# Patient Record
Sex: FEMALE | Race: BLACK OR AFRICAN AMERICAN | NOT HISPANIC OR LATINO | Employment: UNEMPLOYED | URBAN - METROPOLITAN AREA
[De-identification: names, ages, dates, MRNs, and addresses within clinical notes are randomized per-mention and may not be internally consistent; named-entity substitution may affect disease eponyms.]

---

## 2019-03-19 ENCOUNTER — OFFICE VISIT (OUTPATIENT)
Dept: PODIATRY | Facility: CLINIC | Age: 84
End: 2019-03-19
Payer: MEDICARE

## 2019-03-19 VITALS
WEIGHT: 166 LBS | BODY MASS INDEX: 25.16 KG/M2 | HEIGHT: 68 IN | DIASTOLIC BLOOD PRESSURE: 76 MMHG | SYSTOLIC BLOOD PRESSURE: 136 MMHG | HEART RATE: 82 BPM

## 2019-03-19 DIAGNOSIS — B35.1 ONYCHOMYCOSIS DUE TO DERMATOPHYTE: Primary | ICD-10-CM

## 2019-03-19 PROCEDURE — 99999 PR PBB SHADOW E&M-NEW PATIENT-LVL III: ICD-10-PCS | Mod: PBBFAC,,, | Performed by: PODIATRIST

## 2019-03-19 PROCEDURE — 99203 PR OFFICE/OUTPT VISIT, NEW, LEVL III, 30-44 MIN: ICD-10-PCS | Mod: S$GLB,,, | Performed by: PODIATRIST

## 2019-03-19 PROCEDURE — 99203 OFFICE O/P NEW LOW 30 MIN: CPT | Mod: S$GLB,,, | Performed by: PODIATRIST

## 2019-03-19 PROCEDURE — 99999 PR PBB SHADOW E&M-NEW PATIENT-LVL III: CPT | Mod: PBBFAC,,, | Performed by: PODIATRIST

## 2019-03-19 PROCEDURE — 1101F PT FALLS ASSESS-DOCD LE1/YR: CPT | Mod: CPTII,S$GLB,, | Performed by: PODIATRIST

## 2019-03-19 PROCEDURE — 1101F PR PT FALLS ASSESS DOC 0-1 FALLS W/OUT INJ PAST YR: ICD-10-PCS | Mod: CPTII,S$GLB,, | Performed by: PODIATRIST

## 2019-03-19 NOTE — PATIENT INSTRUCTIONS
Recommend lotions: eucerin, eucerin for diabetics, aquaphor, A&D ointment, gold bond for diabetics, sween, Martin's Bees all purpose baby ointment,  urea 40 with aloe (found on amazon.com)    Shoe recommendations: (try 6pm.com, zappos.Blaze health , nordstromrack.Blaze health, or shoes.Blaze health for discounted prices) you can visit [employer] shoes in [place]  or [facility] in the [facility] (there are also several shoe brand outlets in the [facility])    Asics (GT 2000 or gel foundations), new balance stability type shoes (such as the 940 series), saucony (stabil c3),  Noble (GTS or Beast or transcend), propet (tennis shoe)    Sofft Brand (women) Vijaya&Loki (men), clarks, crocs, aerosoles, naturalizers, SAS, ecco, born, lorene joyner, rockports (dress shoes)    Vionic, burkenstocks, fitflops, propet (sandals)  Nike comfort thong sandals, crocs, propet (house shoes)    Nail Home remedy:  Vicks Vapor rub to nails for easier manageability    Athletes Foot     Athletes Foot is caused by a fungal infection in the skin. It affects the skin between the toes where it causes fissures (cracks in the skin). It can also affect the bottom of the foot where it causes dry white scales and peeling of the skin. This infection is more likely to occur when the foot is in hot, sweaty socks and shoes for long periods of time.   This infection is treated with skin creams or oral medicine.     Home Care:   It is important to keep the feet dry. Use absorbent cotton socks and change them if they become sweaty; or wear an open-toe shoe or sandal. Wash the feet at least once a day with soap and water.   Rotate your shoes. If you must wear the same shoes everyday then spray the shoes with lysol or antifungal spray and allow that to dry overnight before wearing the shoes again  Apply the antifungal cream as prescribed. Some antifungal creams are available without a prescription (Lotrimin, Tinactin).   It may take a week before the rash starts to improve and it can  take about three to four weeks to completely clear. Continue the medicine until the rash is all gone.   Use over-the-counter antifungal powders or sprays on your feet after exposure to high-risk environments (public showers, gyms and locker rooms) may prevent future infections. You may wish to use appropriate footwear to reduce exposure.  Clean tubs and bathroom floor with bleach  Clean feet with Nizoral shampoo or dial antibacterial soap and then dry completely.    Follow Up   with your doctor as recommended by our staff if the rash is not starting to improve after TEN days of treatment, or if the rash continues to spread.     Get Prompt Medical Attention   if any of the following occur:   Increasing redness or swelling of the foot   Pus draining from cracks in the skin   Fever of 100.4ºF (38ºC) or higher, or as directed by your healthcare provider    © 2220-0692 Keysha Abrams, [address] [place] [address]. All rights reserved. This information is not intended as a substitute for professional medical care. Always follow your healthcare professional's instructions.

## 2019-03-19 NOTE — LETTER
March 19, 2019      Tommy Westfall MD  [address] [facility] [address]  [address]  [place] [address]           [facility]  [address] [facility]  [place] [address]  Phone: [phone]          Patient: Hedy Ryan   MR Number: [id number]   YOB: 1935   Date of Visit: 3/19/2019       Dear Dr. Tommy Westfall:    Thank you for referring Hedy Ryan to me for evaluation. Attached you will find relevant portions of my assessment and plan of care.    If you have questions, please do not hesitate to call me. I look forward to following Hedy Ryan along with you.    Sincerely,    Samanta Iglesias DPM    Enclosure  CC:  No Recipients    If you would like to receive this communication electronically, please contact [email or web address] or [phone] to request more information on [facility] access.    For providers and/or their staff who would like to refer a patient to [facility], please contact us through our one-stop-shop provider referral line, [facility], at [phone].    If you feel you have received this communication in error or would no longer like to receive these types of communications, please e-mail [email or web address]

## 2019-03-19 NOTE — PROGRESS NOTES
"Subjective:      Patient ID: Hedy Ryan is a 83 y.o. female.    Chief Complaint: Nail Problem (fungus Pcp Dr. Westfall 10/11/18) and Nail Care    Hedy Ryan is a 83 y.o. female who presents to the clinic complaining of "ugly white toenails." Patient has not attempted self treatment.  This is a persistent problem. Patient denies history of trauma. Patient  denies purulent drainage.    Current shoe gear:  Casual shoes    Patient Active Problem List   Diagnosis    Primary localized osteoarthrosis, lower leg    Subdural hemorrhage    SDH (subdural hematoma)    Primary localized osteoarthrosis, pelvic region and thigh    Subdural hematoma    Subdural hematoma    Cerebral edema    Aphasia    Encephalopathy       Current Outpatient Medications on File Prior to Visit   Medication Sig Dispense Refill    hydrocodone-acetaminophen 5-325mg (NORCO) 5-325 mg per tablet   0     No current facility-administered medications on file prior to visit.        Review of patient's allergies indicates:   Allergen Reactions    Demerol [meperidine] Other (See Comments)     Hallucinations       Past Surgical History:   Procedure Laterality Date    BRAIN SURGERY  3/11/14    craniotomy for SDH    BRAIN SURGERY  6/16/14    craniotomy for SDH    BRAIN SURGERY  6/24/14    craniotomy for SDH    CHOLECYSTECTOMY  1982    Craniotomy  3/2014    after a fall    CRANIOTOMY OPEN FOR SUB-DURAL HEMATOMA Left 6/24/2014    [facility]ND [facility]    CRANIOTOMY OPEN FOR SUB-DURAL HEMATOMA Left 6/16/2014    [facility]ND [facility]    CRANIOTOMY, FOR SUBDURAL HEMATOMA EVACUATION Right 3/11/2014    [facility]ND [facility]    HIP ARTHROPLASTY  4/29/14    right    HYSTERECTOMY  1982    JOINT REPLACEMENT  1993, July    Rt hip relacement    REVISION TOTAL HIP ARTHROPLASTY  2008    cup revision on hip    REVISION, TOTAL ARTHROPLASTY, HIP Right " "2014    [facility]    REVISION, TOTAL ARTHROPLASTY, HIP Right 2014    [facility] at [facility]       History reviewed. No pertinent family history.    Social History     Socioeconomic History    Marital status:      Spouse name: Not on file    Number of children: Not on file    Years of education: Not on file    Highest education level: Not on file   Social Needs    Financial resource strain: Not on file    Food insecurity - worry: Not on file    Food insecurity - inability: Not on file    Transportation needs - medical: Not on file    Transportation needs - non-medical: Not on file   Occupational History    Not on file   Tobacco Use    Smoking status: Former Smoker     Last attempt to quit: 1972     Years since quittin.2    Smokeless tobacco: Never Used   Substance and Sexual Activity    Alcohol use: No    Drug use: No    Sexual activity: No   Other Topics Concern    Not on file   Social History Narrative    Not on file       Review of Systems   Constitution: Negative for chills, fever and weakness.   Cardiovascular: Negative for claudication and leg swelling.   Respiratory: Negative for cough and shortness of breath.    Skin: Positive for dry skin and nail changes. Negative for itching and rash.   Musculoskeletal: Positive for joint pain and myalgias. Negative for falls, joint swelling and muscle weakness.   Gastrointestinal: Negative for diarrhea, nausea and vomiting.   Neurological: Positive for paresthesias. Negative for numbness and tremors.   Psychiatric/Behavioral: Negative for altered mental status and hallucinations.           Objective:      Vitals:    19 1519   BP: 136/76   Pulse: 82   Weight: 75.3 kg (166 lb)   Height: 5' 8" (1.727 m)   PainSc: 0-No pain       Physical Exam   Constitutional:  Non-toxic appearance. She does not have a sickly appearance. No distress.   Cardiovascular:   Pulses:   "     Dorsalis pedis pulses are 2+ on the right side, and 2+ on the left side.        Posterior tibial pulses are 2+ on the right side, and 2+ on the left side.   dorsalis pedis and posterior tibial pulses are palpable bilaterally. Capillary refill time is within normal limits.   Pulmonary/Chest: No respiratory distress.   Musculoskeletal:        Right ankle: She exhibits normal range of motion and no swelling. No tenderness. No lateral malleolus, no medial malleolus, no AITFL, no CF ligament and no posterior TFL tenderness found. Achilles tendon exhibits no pain, no defect and normal Cota's test results.        Left ankle: She exhibits normal range of motion and no swelling. No tenderness. No lateral malleolus, no medial malleolus, no AITFL, no CF ligament and no posterior TFL tenderness found. Achilles tendon exhibits no pain, no defect and normal Cota's test results.        Right foot: There is no tenderness and no bony tenderness.        Left foot: There is no tenderness and no bony tenderness.   Neurological: She has normal reflexes. She displays no atrophy and no tremor. No sensory deficit. She exhibits normal muscle tone.   [place]-Aj 5.07 monofilament is intact bilateral feet. Sharp/dull sensation is also intact Bilateral feet.     Skin: Skin is warm, dry and intact. No bruising, no burn, no laceration, no lesion and no rash noted. She is not diaphoretic. No cyanosis. No pallor. Nails show no clubbing.   Toenails 1-5 bilaterally are elongated by 2-3 mm, thickened by 2-3 mm, discolored/white, dystrophic, brittle with subungual debris.    Psychiatric: Her mood appears not anxious. Her affect is not inappropriate. Her speech is not slurred. She is not combative. She is communicative. She is attentive.   Nursing note reviewed.            Assessment:       Encounter Diagnosis   Name Primary?    Onychomycosis due to dermatophyte Yes         Plan:       Hedy was seen today for nail problem and nail  care.    Diagnoses and all orders for this visit:    Onychomycosis due to dermatophyte      I counseled the patient on her conditions, their implications and medical management.    Discussed condition and treatment options with pt, as well as poor results with most treatments. Discussed filing nails, using antifungal topical ointment vs oral treatment options. Instructed patient on the importance of keeping fungus off of skin while treating nails. Patient instructed to use absorbent cotton socks and change them if they become sweaty; or wear an open-toe shoe or sandal. Wash the feet at least once a day with soap and water. Patient wants to try topical. Rx medicated foot soaks/antifungal topicals from [employer] to be delivered to patient home.  Soaks not to exceed 10 minutes, patient is to dry thoroughly between toes.   Instructed patient that it takes time for symptoms to completely dissipate. Patient instructed to use lysol or over-the-counter antifungal powders or sprays to shoes daily and allow them to air dry, switching shoes from every other day would be optimal. Patient is to avoid barefoot walking in  high-risk environments (public showers, gyms and locker rooms) may prevent future infections.     Informed patient that many nail problems can be prevented by wearing the right shoes and trimming your nails properly.   The right shoes: Feet were measured.  Patient is to wear shoes that are supportive and roomy enough for toes to wiggle. Look for shoes made of natural materials such as leather, which allow  feet to breathe.   Proper trimming: To avoid problems, she was instructed to trim toenails straight across without cutting down into the corners.      RTC PRN       Procedures

## 2019-09-18 ENCOUNTER — HOSPITAL ENCOUNTER (EMERGENCY)
Facility: HOSPITAL | Age: 84
Discharge: HOME | End: 2019-09-18
Attending: EMERGENCY MEDICINE
Payer: MEDICARE

## 2019-09-18 VITALS
RESPIRATION RATE: 20 BRPM | BODY MASS INDEX: 25.91 KG/M2 | TEMPERATURE: 99 F | HEIGHT: 68 IN | HEART RATE: 90 BPM | WEIGHT: 171 LBS | OXYGEN SATURATION: 95 % | SYSTOLIC BLOOD PRESSURE: 136 MMHG | DIASTOLIC BLOOD PRESSURE: 73 MMHG

## 2019-09-18 DIAGNOSIS — R03.0 ELEVATED BLOOD PRESSURE READING: Primary | ICD-10-CM

## 2019-09-18 DIAGNOSIS — F41.9 ANXIETY: ICD-10-CM

## 2019-09-18 PROCEDURE — 99281 EMR DPT VST MAYX REQ PHY/QHP: CPT

## 2019-09-18 NOTE — ED PROVIDER NOTES
"Encounter Date: 9/18/2019    SCRIBE #1 NOTE: I, Han Justin, am scribing for, and in the presence of,  Tommy Love MD. I have scribed the following portions of the note - Other sections scribed: HPI, ROS.       History     Chief Complaint   Patient presents with    Headache     pt reports frontal headache pressure & "tightness" in bilateral arms starting at 9pm last night; pt reports having a "funny feeling" this morning & appears semi-anxious; pt also reports that she checks her BP & HR daily and this morning it was 202/101; pt denies hx of HTN and denies taking any daily meds; pt denies any pain     CC: Hypertension    HPI: This 83 y.o F with a hx of Intracranial bleed and Arthritis presents to the ED for emergent evaluation of HTN. The pt states she was unable to sleep last night "because something was just irritating me." She reports recording a BP of 202/101 mmHg and a HR of 117bpm on her at home machine at 0545h this AM. The pt states that she then became "panicked" and began experiencing a "funny feeling" in the front of her head. The pt notes that she records her BP several times daily and denies previous episodes of similar symptoms. The pt denies headache, fever, chills, diaphoresis, nausea, emesis, diarrhea, abdominal pain, chest pain, SOB, dysuria, eye pain, otalgia and sore throat. No prior tx. She does not smoke cigarettes, consume EtOH or use illicit drugs.       Past Surgical Hx: Hysterectomy, Cholecystectomy, Craniotomy, Hip Arthroplasty        Review of patient's allergies indicates:   Allergen Reactions    Demerol [meperidine] Other (See Comments)     Hallucinations     Past Medical History:   Diagnosis Date    Arthritis     Dental bridge present     Encounter for blood transfusion     Intracranial bleed 3/2014    s/p a fall    Wears partial dentures     upper only     Past Surgical History:   Procedure Laterality Date    BRAIN SURGERY  3/11/14    craniotomy for SDH    BRAIN SURGERY  " "14    craniotomy for SDH    BRAIN SURGERY  14    craniotomy for SDH    CHOLECYSTECTOMY  1982    Craniotomy  3/2014    after a fall    CRANIOTOMY OPEN FOR SUB-DURAL HEMATOMA Left 2014    [facility] 2ND FLR    CRANIOTOMY OPEN FOR SUB-DURAL HEMATOMA Left 2014    [facility] 2ND FLR    CRANIOTOMY, FOR SUBDURAL HEMATOMA EVACUATION Right 3/11/2014    [facility] 2ND FLR    HIP ARTHROPLASTY  14    right    HYSTERECTOMY  1982    JOINT REPLACEMENT  ,  hip relacement    REVISION TOTAL HIP ARTHROPLASTY      cup revision on hip    REVISION, TOTAL ARTHROPLASTY, HIP Right 2014    [facility] at [facility]    REVISION, TOTAL ARTHROPLASTY, HIP Right 2014    [facility] at [facility] [place]     No family history on file.  Social History     Tobacco Use    Smoking status: Former Smoker     Last attempt to quit: 1972     Years since quittin.7    Smokeless tobacco: Never Used   Substance Use Topics    Alcohol use: No    Drug use: No     Review of Systems   Constitutional: Negative for chills and fever.   HENT: Negative for congestion, ear pain, rhinorrhea and sore throat.         (+) "funny feeling" in the front of the head   Eyes: Negative for pain and visual disturbance.   Respiratory: Negative for cough and shortness of breath.    Cardiovascular: Negative for chest pain.   Gastrointestinal: Negative for abdominal pain, diarrhea, nausea and vomiting.   Genitourinary: Negative for dysuria.   Musculoskeletal: Negative for back pain and neck pain.   Skin: Negative for rash.   Neurological: Negative for headaches.       Physical Exam     Initial Vitals [19 0618]   BP Pulse Resp Temp SpO2   (!) 159/77 105 20 98.5 °F (36.9 °C) 95 %      MAP       --         Physical Exam  The patient was examined specifically for the following: "   General:No significant distress, Good color, Warm and dry. Head and neck:Scalp atraumatic, Neck supple. Neurological:Appropriate conversation, Gross motor deficits. Eyes:Conjugate gaze, Clear corneas. ENT: No epistaxis. Cardiac: Regular rate and rhythm, Grossly normal heart tones. Pulmonary: Wheezing, Rales. Gastrointestinal: Abdominal tenderness, Abdominal distention. Musculoskeletal: Extremity deformity, Apparent pain with range of motion of the joints. Skin: Rash.   The findings on examination were normal.  The neck is supple.  Lungs are clear.  The heart tones are normal. The abdomen is nontender.  The patient is tearful and alert. Vital signs are stable.  ED Course   Procedures  Labs Reviewed - No data to display       Imaging Results    None       Medical decision making:  Given the above this patient had nicely blood pressure reading of 207/103 this morning.  She had a funny feeling in front of her head.  There is no actual head pain.  There is no occipital discomfort neck pain.  The patient has no neurologic deficits.  She felt that she panicked.  She now feels well. Her repeat blood pressure is normal.  The patient is not treated for hypertension.  She has no medical problems takes no medicines.  I believe this was an anxiety reaction as she suggest.  I will discharge her to outpatient evaluation and treatment.                Scribe Attestation:   Scribe #1: I performed the above scribed service and the documentation accurately describes the services I performed. I attest to the accuracy of the note.     I personally performed the services described in this documentation.  All medical record  entries made by the scribe are at my direction and in my presence.  Signed, Dr. Ivan sanchez           Clinical Impression:       ICD-10-CM ICD-9-CM   1. Elevated blood pressure reading R03.0 796.2   2. Anxiety F41.9 300.00                                Tommy Love MD  09/20/19 0277

## 2019-09-18 NOTE — DISCHARGE INSTRUCTIONS
Please return immediately if you get worse or if new problems develop.  Please follow-up with her primary care doctor as scheduled.  Rest.
